# Patient Record
Sex: FEMALE | Race: ASIAN | URBAN - METROPOLITAN AREA
[De-identification: names, ages, dates, MRNs, and addresses within clinical notes are randomized per-mention and may not be internally consistent; named-entity substitution may affect disease eponyms.]

---

## 2017-10-01 ENCOUNTER — TRANSFERRED RECORDS (OUTPATIENT)
Dept: HEALTH INFORMATION MANAGEMENT | Facility: CLINIC | Age: 32
End: 2017-10-01

## 2017-10-01 LAB — PAP SMEAR - HIM PATIENT REPORTED: NEGATIVE

## 2017-11-10 LAB
HBV SURFACE AG SERPL QL IA: NORMAL
RUBELLA ANTIBODY IGG QUANTITATIVE: NORMAL IU/ML

## 2018-04-30 LAB — GROUP B STREP PCR: NORMAL

## 2018-05-30 ENCOUNTER — ANESTHESIA EVENT (OUTPATIENT)
Dept: OBGYN | Facility: CLINIC | Age: 33
End: 2018-05-30
Payer: COMMERCIAL

## 2018-05-30 ENCOUNTER — ANESTHESIA (OUTPATIENT)
Dept: OBGYN | Facility: CLINIC | Age: 33
End: 2018-05-30
Payer: COMMERCIAL

## 2018-05-30 ENCOUNTER — HOSPITAL ENCOUNTER (INPATIENT)
Facility: CLINIC | Age: 33
LOS: 2 days | Discharge: HOME | End: 2018-06-01
Attending: OBSTETRICS & GYNECOLOGY | Admitting: OBSTETRICS & GYNECOLOGY
Payer: COMMERCIAL

## 2018-05-30 LAB
ABO + RH BLD: NORMAL
ABO + RH BLD: NORMAL
SPECIMEN EXP DATE BLD: NORMAL
T PALLIDUM AB SER QL: NONREACTIVE

## 2018-05-30 PROCEDURE — 25000128 H RX IP 250 OP 636: Performed by: ANESTHESIOLOGY

## 2018-05-30 PROCEDURE — 25000128 H RX IP 250 OP 636: Performed by: PHYSICIAN ASSISTANT

## 2018-05-30 PROCEDURE — 72200001 ZZH LABOR CARE VAGINAL DELIVERY SINGLE

## 2018-05-30 PROCEDURE — 37000011 ZZH ANESTHESIA WARD SERVICE

## 2018-05-30 PROCEDURE — 00HU33Z INSERTION OF INFUSION DEVICE INTO SPINAL CANAL, PERCUTANEOUS APPROACH: ICD-10-PCS | Performed by: ANESTHESIOLOGY

## 2018-05-30 PROCEDURE — 86900 BLOOD TYPING SEROLOGIC ABO: CPT | Performed by: PHYSICIAN ASSISTANT

## 2018-05-30 PROCEDURE — 3E0R3BZ INTRODUCTION OF ANESTHETIC AGENT INTO SPINAL CANAL, PERCUTANEOUS APPROACH: ICD-10-PCS | Performed by: ANESTHESIOLOGY

## 2018-05-30 PROCEDURE — 36415 COLL VENOUS BLD VENIPUNCTURE: CPT | Performed by: PHYSICIAN ASSISTANT

## 2018-05-30 PROCEDURE — 25000132 ZZH RX MED GY IP 250 OP 250 PS 637: Performed by: OBSTETRICS & GYNECOLOGY

## 2018-05-30 PROCEDURE — 12000037 ZZH R&B POSTPARTUM INTERMEDIATE

## 2018-05-30 PROCEDURE — 86901 BLOOD TYPING SEROLOGIC RH(D): CPT | Performed by: PHYSICIAN ASSISTANT

## 2018-05-30 PROCEDURE — 86780 TREPONEMA PALLIDUM: CPT | Performed by: PHYSICIAN ASSISTANT

## 2018-05-30 PROCEDURE — 10907ZC DRAINAGE OF AMNIOTIC FLUID, THERAPEUTIC FROM PRODUCTS OF CONCEPTION, VIA NATURAL OR ARTIFICIAL OPENING: ICD-10-PCS | Performed by: OBSTETRICS & GYNECOLOGY

## 2018-05-30 PROCEDURE — 0HQ9XZZ REPAIR PERINEUM SKIN, EXTERNAL APPROACH: ICD-10-PCS | Performed by: OBSTETRICS & GYNECOLOGY

## 2018-05-30 PROCEDURE — 25000125 ZZHC RX 250: Performed by: PHYSICIAN ASSISTANT

## 2018-05-30 PROCEDURE — 25000125 ZZHC RX 250: Performed by: OBSTETRICS & GYNECOLOGY

## 2018-05-30 RX ORDER — OXYTOCIN/0.9 % SODIUM CHLORIDE 30/500 ML
100-340 PLASTIC BAG, INJECTION (ML) INTRAVENOUS CONTINUOUS PRN
Status: DISCONTINUED | OUTPATIENT
Start: 2018-05-30 | End: 2018-05-30

## 2018-05-30 RX ORDER — ROPIVACAINE HYDROCHLORIDE 2 MG/ML
10 INJECTION, SOLUTION EPIDURAL; INFILTRATION; PERINEURAL ONCE
Status: DISCONTINUED | OUTPATIENT
Start: 2018-05-30 | End: 2018-05-30

## 2018-05-30 RX ORDER — EPHEDRINE SULFATE 50 MG/ML
INJECTION, SOLUTION INTRAMUSCULAR; INTRAVENOUS; SUBCUTANEOUS
Status: DISCONTINUED
Start: 2018-05-30 | End: 2018-05-30 | Stop reason: WASHOUT

## 2018-05-30 RX ORDER — NALOXONE HYDROCHLORIDE 0.4 MG/ML
.1-.4 INJECTION, SOLUTION INTRAMUSCULAR; INTRAVENOUS; SUBCUTANEOUS
Status: DISCONTINUED | OUTPATIENT
Start: 2018-05-30 | End: 2018-05-30

## 2018-05-30 RX ORDER — NALBUPHINE HYDROCHLORIDE 10 MG/ML
2.5-5 INJECTION, SOLUTION INTRAMUSCULAR; INTRAVENOUS; SUBCUTANEOUS EVERY 6 HOURS PRN
Status: DISCONTINUED | OUTPATIENT
Start: 2018-05-30 | End: 2018-05-30

## 2018-05-30 RX ORDER — BISACODYL 10 MG
10 SUPPOSITORY, RECTAL RECTAL DAILY PRN
Status: DISCONTINUED | OUTPATIENT
Start: 2018-06-01 | End: 2018-06-01 | Stop reason: HOSPADM

## 2018-05-30 RX ORDER — PRENATAL VIT/IRON FUM/FOLIC AC 27MG-0.8MG
1 TABLET ORAL DAILY
Status: ON HOLD | COMMUNITY
End: 2018-05-30

## 2018-05-30 RX ORDER — ONDANSETRON 2 MG/ML
4 INJECTION INTRAMUSCULAR; INTRAVENOUS EVERY 6 HOURS PRN
Status: DISCONTINUED | OUTPATIENT
Start: 2018-05-30 | End: 2018-05-30

## 2018-05-30 RX ORDER — LANOLIN 100 %
OINTMENT (GRAM) TOPICAL
Status: DISCONTINUED | OUTPATIENT
Start: 2018-05-30 | End: 2018-06-01 | Stop reason: HOSPADM

## 2018-05-30 RX ORDER — CARBOPROST TROMETHAMINE 250 UG/ML
250 INJECTION, SOLUTION INTRAMUSCULAR
Status: DISCONTINUED | OUTPATIENT
Start: 2018-05-30 | End: 2018-05-30

## 2018-05-30 RX ORDER — ACETAMINOPHEN 325 MG/1
650 TABLET ORAL EVERY 4 HOURS PRN
Status: DISCONTINUED | OUTPATIENT
Start: 2018-05-30 | End: 2018-06-01 | Stop reason: HOSPADM

## 2018-05-30 RX ORDER — AMOXICILLIN 250 MG
2 CAPSULE ORAL 2 TIMES DAILY
Status: DISCONTINUED | OUTPATIENT
Start: 2018-05-30 | End: 2018-06-01 | Stop reason: HOSPADM

## 2018-05-30 RX ORDER — ROPIVACAINE HYDROCHLORIDE 2 MG/ML
INJECTION, SOLUTION EPIDURAL; INFILTRATION; PERINEURAL
Status: COMPLETED
Start: 2018-05-30 | End: 2018-05-30

## 2018-05-30 RX ORDER — LIDOCAINE HYDROCHLORIDE AND EPINEPHRINE 15; 5 MG/ML; UG/ML
3 INJECTION, SOLUTION EPIDURAL
Status: DISCONTINUED | OUTPATIENT
Start: 2018-05-30 | End: 2018-05-30

## 2018-05-30 RX ORDER — ROPIVACAINE HYDROCHLORIDE 2 MG/ML
INJECTION, SOLUTION EPIDURAL; INFILTRATION; PERINEURAL PRN
Status: DISCONTINUED | OUTPATIENT
Start: 2018-05-30 | End: 2018-05-31 | Stop reason: HOSPADM

## 2018-05-30 RX ORDER — OXYCODONE AND ACETAMINOPHEN 5; 325 MG/1; MG/1
1 TABLET ORAL
Status: DISCONTINUED | OUTPATIENT
Start: 2018-05-30 | End: 2018-05-30

## 2018-05-30 RX ORDER — PENICILLIN G POTASSIUM 5000000 [IU]/1
5 INJECTION, POWDER, FOR SOLUTION INTRAMUSCULAR; INTRAVENOUS ONCE
Status: COMPLETED | OUTPATIENT
Start: 2018-05-30 | End: 2018-05-30

## 2018-05-30 RX ORDER — SODIUM CHLORIDE, SODIUM LACTATE, POTASSIUM CHLORIDE, CALCIUM CHLORIDE 600; 310; 30; 20 MG/100ML; MG/100ML; MG/100ML; MG/100ML
INJECTION, SOLUTION INTRAVENOUS CONTINUOUS
Status: DISCONTINUED | OUTPATIENT
Start: 2018-05-30 | End: 2018-05-30

## 2018-05-30 RX ORDER — METHYLERGONOVINE MALEATE 0.2 MG/ML
200 INJECTION INTRAVENOUS
Status: DISCONTINUED | OUTPATIENT
Start: 2018-05-30 | End: 2018-05-30

## 2018-05-30 RX ORDER — ACETAMINOPHEN 325 MG/1
650 TABLET ORAL EVERY 4 HOURS PRN
Status: DISCONTINUED | OUTPATIENT
Start: 2018-05-30 | End: 2018-05-30

## 2018-05-30 RX ORDER — IBUPROFEN 400 MG/1
800 TABLET, FILM COATED ORAL EVERY 6 HOURS PRN
Status: DISCONTINUED | OUTPATIENT
Start: 2018-05-30 | End: 2018-06-01 | Stop reason: HOSPADM

## 2018-05-30 RX ORDER — MISOPROSTOL 200 UG/1
400 TABLET ORAL
Status: DISCONTINUED | OUTPATIENT
Start: 2018-05-30 | End: 2018-06-01 | Stop reason: HOSPADM

## 2018-05-30 RX ORDER — EPHEDRINE SULFATE 50 MG/ML
5 INJECTION, SOLUTION INTRAMUSCULAR; INTRAVENOUS; SUBCUTANEOUS
Status: DISCONTINUED | OUTPATIENT
Start: 2018-05-30 | End: 2018-05-30

## 2018-05-30 RX ORDER — OXYTOCIN/0.9 % SODIUM CHLORIDE 30/500 ML
1-24 PLASTIC BAG, INJECTION (ML) INTRAVENOUS CONTINUOUS
Status: DISCONTINUED | OUTPATIENT
Start: 2018-05-30 | End: 2018-05-30

## 2018-05-30 RX ORDER — ONDANSETRON 4 MG/1
4 TABLET, ORALLY DISINTEGRATING ORAL EVERY 6 HOURS PRN
Status: DISCONTINUED | OUTPATIENT
Start: 2018-05-30 | End: 2018-05-30

## 2018-05-30 RX ORDER — IBUPROFEN 400 MG/1
800 TABLET, FILM COATED ORAL
Status: DISCONTINUED | OUTPATIENT
Start: 2018-05-30 | End: 2018-05-30

## 2018-05-30 RX ORDER — OXYTOCIN/0.9 % SODIUM CHLORIDE 30/500 ML
340 PLASTIC BAG, INJECTION (ML) INTRAVENOUS CONTINUOUS PRN
Status: DISCONTINUED | OUTPATIENT
Start: 2018-05-30 | End: 2018-06-01 | Stop reason: HOSPADM

## 2018-05-30 RX ORDER — LIDOCAINE 40 MG/G
CREAM TOPICAL
Status: DISCONTINUED | OUTPATIENT
Start: 2018-05-30 | End: 2018-05-30

## 2018-05-30 RX ORDER — AMOXICILLIN 250 MG
1 CAPSULE ORAL 2 TIMES DAILY
Status: DISCONTINUED | OUTPATIENT
Start: 2018-05-30 | End: 2018-06-01 | Stop reason: HOSPADM

## 2018-05-30 RX ORDER — HYDROCORTISONE 2.5 %
CREAM (GRAM) TOPICAL 3 TIMES DAILY PRN
Status: DISCONTINUED | OUTPATIENT
Start: 2018-05-30 | End: 2018-06-01 | Stop reason: HOSPADM

## 2018-05-30 RX ORDER — OXYTOCIN 10 [USP'U]/ML
10 INJECTION, SOLUTION INTRAMUSCULAR; INTRAVENOUS
Status: DISCONTINUED | OUTPATIENT
Start: 2018-05-30 | End: 2018-06-01 | Stop reason: HOSPADM

## 2018-05-30 RX ORDER — OXYTOCIN/0.9 % SODIUM CHLORIDE 30/500 ML
100 PLASTIC BAG, INJECTION (ML) INTRAVENOUS CONTINUOUS
Status: DISCONTINUED | OUTPATIENT
Start: 2018-05-30 | End: 2018-06-01 | Stop reason: HOSPADM

## 2018-05-30 RX ORDER — NALOXONE HYDROCHLORIDE 0.4 MG/ML
.1-.4 INJECTION, SOLUTION INTRAMUSCULAR; INTRAVENOUS; SUBCUTANEOUS
Status: DISCONTINUED | OUTPATIENT
Start: 2018-05-30 | End: 2018-06-01 | Stop reason: HOSPADM

## 2018-05-30 RX ADMIN — ACETAMINOPHEN 650 MG: 325 TABLET, FILM COATED ORAL at 17:41

## 2018-05-30 RX ADMIN — PENICILLIN G POTASSIUM 5 MILLION UNITS: 5000000 POWDER, FOR SOLUTION INTRAMUSCULAR; INTRAPLEURAL; INTRATHECAL; INTRAVENOUS at 08:21

## 2018-05-30 RX ADMIN — Medication 12 ML/HR: at 11:04

## 2018-05-30 RX ADMIN — IBUPROFEN 800 MG: 400 TABLET ORAL at 17:41

## 2018-05-30 RX ADMIN — Medication 2.5 MILLION UNITS: at 12:46

## 2018-05-30 RX ADMIN — Medication 2 MILLI-UNITS/MIN: at 08:22

## 2018-05-30 RX ADMIN — Medication 340 ML/HR: at 14:21

## 2018-05-30 RX ADMIN — SODIUM CHLORIDE, POTASSIUM CHLORIDE, SODIUM LACTATE AND CALCIUM CHLORIDE: 600; 310; 30; 20 INJECTION, SOLUTION INTRAVENOUS at 08:21

## 2018-05-30 RX ADMIN — SENNOSIDES AND DOCUSATE SODIUM 1 TABLET: 8.6; 5 TABLET ORAL at 21:06

## 2018-05-30 RX ADMIN — ROPIVACAINE HYDROCHLORIDE 10 ML: 2 INJECTION, SOLUTION EPIDURAL; INFILTRATION at 10:47

## 2018-05-30 ASSESSMENT — ENCOUNTER SYMPTOMS
SEIZURES: 0
DYSRHYTHMIAS: 0

## 2018-05-30 NOTE — PLAN OF CARE
Data: Alexsandra Pickens transferred to 414 via wheelchair at 1810. Baby transferred via parent's arms.  Action: Receiving unit notified of transfer: Yes. Patient and family notified of room change. Report given to Selene MORROW at 1810. Belongings sent to receiving unit. Accompanied by Registered Nurse. Oriented patient to surroundings. Call light within reach. ID bands double-checked with receiving RN.  Response: Patient tolerated transfer and is stable.

## 2018-05-30 NOTE — IP AVS SNAPSHOT
[address]    [place] [address]    Phone:  [phone]                                       After Visit Summary   5/30/2018    Alexsandra Pickens    MRN: [id number]           After Visit Summary Signature Page     I have received my discharge instructions, and my questions have been answered. I have discussed any challenges I see with this plan with the nurse or doctor.    ..........................................................................................................................................  Patient/Patient Representative Signature      ..........................................................................................................................................  Patient Representative Print Name and Relationship to Patient    ..................................................               ................................................  Date                                            Time    ..........................................................................................................................................  Reviewed by Signature/Title    ...................................................              ..............................................  Date                                                            Time

## 2018-05-30 NOTE — IP AVS SNAPSHOT
MRN:[id number]                      After Visit Summary   5/30/2018    Alexsandra Pickens    MRN: [id number]           Thank you!     Thank you for choosing [facility] for your care. Our goal is always to provide you with excellent care. Hearing back from our patients is one way we can continue to improve our services. Please take a few minutes to complete the written survey that you may receive in the mail after you visit with us. Thank you!        Patient Information     Date Of Birth          1985        Designated Caregiver       Most Recent Value    Caregiver    Name of designated caregiver Issa Santiago    Phone number of caregiver [phone]      About your hospital stay     You were admitted on:  May 30, 2018 You last received care in the:  [address]    You were discharged on:  June 1, 2018       Who to Call     For medical emergencies, please call 911.  For non-urgent questions about your medical care, please call your primary care provider or clinic, None          Attending Provider     Provider Specialty    Jose R Kelsey MD OB/Gyn       Primary Care Provider Fax #    Physician No Ref-Primary [phone]      Further instructions from your care team       Postpartum Vaginal Delivery Instructions    Activity       Ask family and friends for help when you need it.    Do not place anything in your vagina for 6 weeks.    You are not restricted on other activities, but take it easy for a few weeks to allow your body to recover from delivery.  You are able to do any activities you feel up to that point.    No driving until you have stopped taking your pain medications (usually two weeks after delivery).     Call your health care provider if you have any of these symptoms:       Increased pain, swelling, redness, or fluid around your stiches from an episiotomy or perineal tear.    A fever above 100.4 F (38 C) with or without chills when  "placing a thermometer under your tongue.    You soak a sanitary pad with blood within 1 hour, or you see blood clots larger than a golf ball.    Bleeding that lasts more than 6 weeks.    Vaginal discharge that smells bad.    Severe pain, cramping or tenderness in your lower belly area.    A need to urinate more frequently (use the toilet more often), more urgently (use the toilet very quickly), or it burns when you urinate.    Nausea and vomiting.    Redness, swelling or pain around a vein in your leg.    Problems breastfeeding or a red or painful area on your breast.    Chest pain and cough or are gasping for air.    Problems coping with sadness, anxiety, or depression.  If you have any concerns about hurting yourself or the baby, call your provider immediately.     You have questions or concerns after you return home.     Keep your hands clean:  Always wash your hands before touching your perineal area and stitches.  This helps reduce your risk of infection.  If your hands aren't dirty, you may use an alcohol hand-rub to clean your hands. Keep your nails clean and short.        Pending Results     No orders found from 5/28/2018 to 5/31/2018.            Statement of Approval     Ordered          06/01/18 1432  I have reviewed and agree with all the recommendations and orders detailed in this document.  EFFECTIVE NOW     Approved and electronically signed by:  Jose R Kelsey MD             Admission Information     Date & Time Provider Department Dept. Phone    5/30/2018 Jose R Kelsey MD [address] [phone]      Your Vitals Were     Blood Pressure Pulse Temperature Respirations Height Weight    111/74 61 97.9  F (36.6  C) (Oral) 16 1.64 m (5' 4.57\") 75.8 kg (167 lb)    Pulse Oximetry BMI (Body Mass Index)                96% 28.16 kg/m2          MyChart Information     [employer] lets you send messages to your doctor, view your test results, renew your " "prescriptions, schedule appointments and more. To sign up, go to [email or web address] . Click on \"Log in\" on the left side of the screen, which will take you to the Welcome page. Then click on \"Sign up Now\" on the right side of the page.     You will be asked to enter the access code listed below, as well as some personal information. Please follow the directions to create your username and password.     Your access code is: [email or web address]  Expires: 2018  2:37 PM     Your access code will  in 90 days. If you need help or a new code, please call your [facility] clinic or [phone].        Care EveryWhere ID     This is your Care EveryWhere ID. This could be used by other organizations to access your [facility] medical records  [id number]        Equal Access to Services     [facility] : Kaia Nix, demetrice christine, ladi hardwick, kamran lerma . So [facility] [phone].    ATENCIÓN: Si habla español, tiene a mccabe disposición servicios gratuitos de asistencia lingüística. Llame al [phone].    We comply with applicable federal civil rights laws and [place] laws. We do not discriminate on the basis of race, color, national origin, age, disability, sex, sexual orientation, or gender identity.               Review of your medicines      CONTINUE these medicines which have NOT CHANGED        Dose / Directions    [facility] PRENATAL 28-0.8 MG Tabs        Refills:  0                Protect others around you: Learn how to safely use, store and throw away your medicines at www.disposemymeds.org.             Medication List: This is a list of all your medications and when to take them. Check marks below indicate your daily home schedule. Keep this list as a reference.      Medications           Morning Afternoon Evening Bedtime As Needed    [facility] PRENATAL 28-0.8 MG Tabs                                  "

## 2018-05-30 NOTE — PLAN OF CARE
Pt. admitted from L&D  via wheelchair and transferred to bed with standby assist. Pt. arrived with baby and was accompanied by  and arrived with personal belongings. Report was taken from Dianna in L&D. VSS. Fundus is firm and midline.  Vaginal bleeding is small rubra.  SL in left arm..  Pt. oriented to the room and call light system.

## 2018-05-30 NOTE — ANESTHESIA PREPROCEDURE EVALUATION
"Procedure: KAREN  Preop diagnosis: IUP/Labor     Allergies   Allergen Reactions     No Known Allergies      History reviewed. No pertinent past medical history.  History reviewed. No pertinent surgical history.  Social History   Substance Use Topics     Smoking status: Never Smoker     Smokeless tobacco: Never Used     Alcohol use No     Prior to Admission medications    Medication Sig Start Date End Date Taking? Authorizing Provider   Prenatal Vit-Fe Fumarate-FA ([facility] PR[facility]ATAL) 28-0.8 MG TABS  9/20/17  Yes Reported, Patient     Current Facility-Administered Medications Ordered in Epic   Medication Dose Route Frequency Last Rate Last Dose     acetaminophen (TYLENOL) tablet 650 mg  650 mg Oral Q4H PRN         carboprost (HEMABATE) injection 250 mcg  250 mcg Intramuscular Once PRN         ePHEDrine injection 5 mg  5 mg Intravenous Q3 Min PRN         fentaNYL (SUBLIMAZE) 2 mcg/mL, ropivacaine (NAROPIN) 0.2% in NaCl 0.9% EPIDURAL infusion   EPIDURAL Continuous 12 mL/hr at 05/30/18 1104 12 mL/hr at 05/30/18 1104     ibuprofen (ADVIL/MOTRIN) tablet 800 mg  800 mg Oral Once PRN         lactated ringers BOLUS 1,000 mL  1,000 mL Intravenous Once PRN        Or     lactated ringers BOLUS 500 mL  500 mL Intravenous Once PRN         lactated ringers BOLUS 250 mL  250 mL Intravenous Once PRN         lactated ringers BOLUS 500 mL  500 mL Intravenous Once PRN         lactated ringers infusion   Intravenous Continuous 125 mL/hr at 05/30/18 0821       lidocaine (LMX4) cream   Topical Q1H PRN         lidocaine 1 % 0.1-20 mL  0.1-20 mL Subcutaneous Once PRN         lidocaine 1 % 1 mL  1 mL Other Q1H PRN         medication instruction   Does not apply Continuous PRN         medication instruction   Does not apply Continuous PRN         Medication Instructions: misoprostol (CYTOTEC)- Nurse to discuss ordering with provider, if needed. Ordered via \"OB misoprostol (CYTOTEC) Postpartum Hemorrhage PANEL\"   Does not apply Continuous PRN         " methylergonovine (METHERGINE) injection 200 mcg  200 mcg Intramuscular Once PRN         nalbuphine (NUBAIN) injection 2.5-5 mg  2.5-5 mg Intravenous Q6H PRN         naloxone (NARCAN) injection 0.1-0.4 mg  0.1-0.4 mg Intravenous Q2 Min PRN         naloxone (NARCAN) injection 0.1-0.4 mg  0.1-0.4 mg Intravenous Q2 Min PRN         ondansetron (ZOFRAN) injection 4 mg  4 mg Intravenous Q6H PRN         Opioid plan postpartum - medication instruction   Does not apply Continuous PRN         oxyCODONE-acetaminophen (PERCOCET) 5-325 MG per tablet 1 tablet  1 tablet Oral Once PRN         oxytocin (PITOCIN) 30 units in 500 mL 0.9% NaCl infusion  1-24 marc-units/min Intravenous Continuous 10 mL/hr at 05/30/18 1100 10 marc-units/min at 05/30/18 1100     oxytocin (PITOCIN) 30 units in 500 mL 0.9% NaCl infusion  100-340 mL/hr Intravenous Continuous PRN         oxytocin (PITOCIN) injection 10 Units  10 Units Intramuscular Once PRN         penicillin G potassium injection 2.5 Million Units  2.5 Million Units Intravenous Q4H         ropivacaine (NAROPIN) 2 MG/ML injection             ropivacaine (NAROPIN) injection 10 mL  10 mL EPIDURAL Once         sodium chloride (PF) 0.9% PF flush 3 mL  3 mL Intracatheter Q1H PRN         sodium chloride (PF) 0.9% PF flush 3 mL  3 mL Intracatheter Q8H         No current [facility]-ordered outpatient prescriptions on file.       fentaNYL-ropivacaine 12 mL/hr (05/30/18 1104)     lactated ringers 125 mL/hr at 05/30/18 0821     - MEDICATION INSTRUCTIONS -       - MEDICATION INSTRUCTIONS -       - MEDICATION INSTRUCTIONS -       - MEDICATION INSTRUCTIONS -       oxytocin in 0.9% NaCl 10 marc-units/min (05/30/18 1100)     oxytocin in 0.9% NaCl       Wt Readings from Last 1 Encounters:   05/30/18 75.8 kg (167 lb)     Temp Readings from Last 1 Encounters:   05/30/18 36.4  C (97.6  F) (Temporal)     BP Readings from Last 6 Encounters:   05/30/18 102/69     Pulse Readings from Last 4 Encounters:   No data found  for Pulse     Resp Readings from Last 1 Encounters:   05/30/18 18     SpO2 Readings from Last 1 Encounters:   05/30/18 100%     Recent Labs   Lab Test  05/30/18   0755   ABO  B   RH  Pos       Anesthesia Evaluation     .             ROS/MED HX    ENT/Pulmonary:      (-) asthma and sleep apnea   Neurologic:     (+)migraines,    (-) seizures and CVA   Cardiovascular:        (-) hypertension, PIH, syncope, arrhythmias, irregular heartbeat/palpitations and valvular problems/murmurs   METS/Exercise Tolerance:     Hematologic:         Musculoskeletal:         GI/Hepatic:     (+) GERD      (-) liver disease   Renal/Genitourinary:      (-) renal disease   Endo:      (-) Type II DM and thyroid disease   Psychiatric:         Infectious Disease:         Malignancy:         Other:                            (-) no pre-eclampsia and gestational diabetes               Anesthesia Plan      History & Physical Review      ASA Status:  2 .        Plan for Epidural          Postoperative Care      Consents  Anesthetic plan, risks, benefits and alternatives discussed with:  Patient and Spouse..

## 2018-05-30 NOTE — L&D DELIVERY NOTE
Delivery Date:  2018      BRIEF HISTORY:  The patient is a 32-year-old  2, para 1-0-0-1, EDC of 2018 at 40 and 3/7 weeks was brought to Labor and Delivery for induction.  Prenatal course had been without complications.      HOSPITAL COURSE:  On admission, she was afebrile with stable vital signs.  Abdomen was gravid, estimated fetal weight approximately 7 pounds, noted to be in the vertex presentation.  Cervix was 2 to 3, 1, -2.  Contractions were minimal.  Fetal heart tones reactive.  She had artificial rupture of membranes, had antibiotic started for a positive group B strep status, as well as Pitocin augmentation.  During the course of her labor, she received an epidural, reached complete dilatation and effacement and was encouraged to push.  During the course of pushing, she brought the head down towards  position.  The head was delivered.  A nuchal cord x 2 was reduced.  Shoulders and torso soon followed, and infant handed off to nursery team in attendance with findings of a viable female infant whose weight is currently pending and Apgars are also unavailable.      She then went on to deliver an intact placenta with a 3-vessel cord.  Perineum was inspected and had a small first-degree laceration which was repaired in routine fashion using a 3-0 Vicryl.  Estimated blood loss about 350 mL.  There were no complications.         BRYAN SONG MD             D: 2018   T: 2018   MT: JU      Name:     RADU DOSHI   MRN:      [id number]        Account:        [id number]   :      1985        Delivery Date:  2018               Document: [id number]

## 2018-05-30 NOTE — ANESTHESIA PROCEDURE NOTES
Peripheral nerve/Neuraxial procedure note : epidural catheter  Pre-Procedure  Performed by POLO AARON  Location: OB      Pre-Anesthestic Checklist: patient identified, IV checked, risks and benefits discussed, informed consent, monitors and equipment checked, pre-op evaluation and at physician/surgeon's request    Timeout  Correct Patient: Yes   Correct Procedure: Yes   Correct Site: Yes   Correct Laterality: N/A   Correct Position: Yes   Site Marked: N/A   .   Procedure Documentation    .    Procedure:    Epidural catheter.  Insertion Site:L3-4  (midline approach) Injection technique: LORT saline   Local skin infiltrated with 3 mL of 1% lidocaine.  JACKELINE at 5 cm     Patient Prep;mask, sterile gloves, povidone-iodine 7.5% surgical scrub, patient draped.  .  Needle: [employer] needle Needle Gauge: 17.    Needle Length (Inches) 3.5  # of attempts: 1 and # of redirects: : 0. .   Catheter: 19 G . .  Catheter threaded easily  3 cm epidural space.  8 cm at skin.   .    Assessment/Narrative  Paresthesias: No.  .  .  Aspiration negative for heme or CSF  . Test dose of 3 mL lidocaine 1.5% w/ 1:200,000 epinephrine at. Test dose negative for signs of intravascular, subdural or intrathecal injection. Comments:  Pt tolerated well.   Taped sterile and secure.   FHTs stable post-procedure.   No complications.

## 2018-05-30 NOTE — PLAN OF CARE
"0730:  40w3d arrived to unit with  for \"induction of labor.\" Ambulated independently to room with belongings. Questions about process answered.   0747: Boulevard and EFM monitored explained and applied with consent. Dr. Kelsey at bedside for SVE and AROM. Discussed plan of care with pt.   1000: Pt reporting feeling uncomfortable, up on birthing ball   1015: Pt requesting epidural  1035: Dr Hannah, JACK at bedside for epidural  1115: Pt reporting that ctx on left side still uncomfortable and strong, MDA called for rebolus   1124: Dr Hannah at bedside for rebolus of epidural   1145: Pt reporting that ctx are feeling better  1230: Chavez placed, SVE done, pt reporting that she is not feeling ctx  1245: Dr. Kelsey at bedside for status update    "

## 2018-05-30 NOTE — H&P
No significant change in general health status based on examination of the patient, review of Nursing Admission Database and prenatal record.

## 2018-05-31 PROCEDURE — 12000037 ZZH R&B POSTPARTUM INTERMEDIATE

## 2018-05-31 PROCEDURE — 25000132 ZZH RX MED GY IP 250 OP 250 PS 637: Performed by: OBSTETRICS & GYNECOLOGY

## 2018-05-31 RX ADMIN — SENNOSIDES AND DOCUSATE SODIUM 1 TABLET: 8.6; 5 TABLET ORAL at 11:39

## 2018-05-31 RX ADMIN — ACETAMINOPHEN 650 MG: 325 TABLET, FILM COATED ORAL at 11:39

## 2018-05-31 RX ADMIN — ACETAMINOPHEN 650 MG: 325 TABLET, FILM COATED ORAL at 05:37

## 2018-05-31 RX ADMIN — IBUPROFEN 800 MG: 400 TABLET ORAL at 11:39

## 2018-05-31 RX ADMIN — ACETAMINOPHEN 650 MG: 325 TABLET, FILM COATED ORAL at 00:20

## 2018-05-31 RX ADMIN — IBUPROFEN 800 MG: 400 TABLET ORAL at 00:20

## 2018-05-31 RX ADMIN — IBUPROFEN 800 MG: 400 TABLET ORAL at 05:37

## 2018-05-31 RX ADMIN — IBUPROFEN 800 MG: 400 TABLET ORAL at 17:33

## 2018-05-31 RX ADMIN — SENNOSIDES AND DOCUSATE SODIUM 1 TABLET: 8.6; 5 TABLET ORAL at 21:17

## 2018-05-31 RX ADMIN — ACETAMINOPHEN 650 MG: 325 TABLET, FILM COATED ORAL at 17:33

## 2018-05-31 NOTE — PROGRESS NOTES
[facility]       DAILY NOTE - POSTPARTUM DAY 1     SUBJECTIVE:     Pain controlled? Yes  Tolerating a regular diet? YES  Ambulating? YES  Voiding without difficulty? Yes    OBJECTIVE:  Vitals:    18 1505 18 1820 18 2045 18 0015   BP: 101/58 102/60 114/68 115/75   Pulse:  68 62 53   Resp:  16 16 16   Temp:  98  F (36.7  C) 98.2  F (36.8  C) 97.8  F (36.6  C)   TempSrc:  Oral Oral Oral   SpO2: 96%      Weight:       Height:           Constitutional: healthy, alert and no distress    Abdomen:  Uterine fundus is firm, non-tender and at the level of the umbilicus     Incision:       LABS:  No results found for: HGB    ASSESSMENT:  Post-partum day #1 s/p   Pregnancy complicated by NO COMPLICATIONS    Doing well.       PLAN:   Continue routine postpartum cares  Anticipate discharge tomorrow    Jose R Kelsey MD

## 2018-05-31 NOTE — PLAN OF CARE
Problem: Patient Care Overview  Goal: Plan of Care/Patient Progress Review  Outcome: Improving  VSS. Bleeding, fundus WDL. Pain well controlled with ibuprofen, tylenol, ice. Voiding and ambulating independently, PIV removed. Breastfeeding well, reports pain on right side only. Deep latch observed on both sides. No signs of infection observed on right breast, encouraged to continue to rotate sides and attempt to relatch for pain. Spouse present and supportive. Will continue to monitor.

## 2018-05-31 NOTE — PLAN OF CARE
Problem: Patient Care Overview  Goal: Plan of Care/Patient Progress Review  Outcome: Improving  Patient is up independently, voiding without difficulty, pain well controlled with medications given as prescribed. She is having more cramping with breastfeeding but ibuprofen and tylenol are helping. Having some nipple tenderness with feeding, latch adjusted and lanolin given. Encouraged to continue to ambulate as able and void frequently.

## 2018-06-01 VITALS
TEMPERATURE: 97.9 F | RESPIRATION RATE: 16 BRPM | OXYGEN SATURATION: 96 % | WEIGHT: 167 LBS | HEART RATE: 61 BPM | DIASTOLIC BLOOD PRESSURE: 74 MMHG | HEIGHT: 65 IN | BODY MASS INDEX: 27.82 KG/M2 | SYSTOLIC BLOOD PRESSURE: 111 MMHG

## 2018-06-01 PROCEDURE — 25000132 ZZH RX MED GY IP 250 OP 250 PS 637: Performed by: OBSTETRICS & GYNECOLOGY

## 2018-06-01 RX ADMIN — ACETAMINOPHEN 650 MG: 325 TABLET, FILM COATED ORAL at 08:03

## 2018-06-01 RX ADMIN — SENNOSIDES AND DOCUSATE SODIUM 1 TABLET: 8.6; 5 TABLET ORAL at 08:04

## 2018-06-01 RX ADMIN — IBUPROFEN 800 MG: 400 TABLET ORAL at 01:39

## 2018-06-01 RX ADMIN — ACETAMINOPHEN 650 MG: 325 TABLET, FILM COATED ORAL at 01:39

## 2018-06-01 RX ADMIN — IBUPROFEN 800 MG: 400 TABLET ORAL at 08:03

## 2018-06-01 NOTE — PLAN OF CARE
Problem: Patient Care Overview  Goal: Plan of Care/Patient Progress Review  Outcome: Improving  VSS. Assessment WDL. Breastfeeding cluster feeding overnight. Patient reports that she believes she had lower supply of breast milk after first child. Supplemented with formula over night once for inconsolable infant.

## 2018-06-01 NOTE — ANESTHESIA POSTPROCEDURE EVALUATION
Patient: Alexsandra Pickens    * No procedures listed *    Diagnosis:* No pre-op diagnosis entered *  Diagnosis Additional Information: No value filed.    Anesthesia Type:  No value filed.    Note:  Anesthesia Post Evaluation    Patient location during evaluation: Bedside (Epidural placement site on the back is clean. No complications / side effects from the labor epidural)  Patient participation: Able to fully participate in evaluation  Level of consciousness: awake and alert  Pain management: adequate  Airway patency: patent  Cardiovascular status: acceptable  Respiratory status: acceptable and unassisted  Hydration status: acceptable  PONV: none             Last vitals:  Vitals:    05/31/18 0015 05/31/18 0805 05/31/18 1659   BP: 115/75 105/65 108/62   Pulse: 53 80 64   Resp: 16 16 16   Temp: 36.6  C (97.8  F) 36.7  C (98  F) 36.4  C (97.5  F)   SpO2:            Electronically Signed By: Kylie Elder MD  May 31, 2018  7:02 PM

## 2018-06-01 NOTE — PLAN OF CARE
Problem: Patient Care Overview  Goal: Plan of Care/Patient Progress Review  Outcome: Adequate for Discharge Date Met: 06/01/18  Vital signs stable. Patient voiding without difficulty. Able to ambulate in room free of dizziness. Taking tylenol/ibuprofen for pain management. Working on breastfeeding infant every 2-3 hours. Planning on discharging home today. Discharge instructions/follow up discussed. Questions/concerns addressed.

## 2018-06-01 NOTE — PROGRESS NOTES
[facility]       DAILY NOTE - POSTPARTUM DAY 2     SUBJECTIVE:     Pain controlled? Yes  Tolerating a regular diet? YES  Ambulating? YES  Voiding without difficulty? Yes    OBJECTIVE:  Vitals:    18 1659 18 2310 18 0803 18 0900   BP: 108/62 107/73 111/74    BP Location:       Pulse: 64 61     Resp: 16 16 16 16   Temp: 97.5  F (36.4  C) 98  F (36.7  C) 97.9  F (36.6  C)    TempSrc: Oral Oral Oral    SpO2:       Weight:       Height:           Constitutional: healthy, alert and no distress    Abdomen:  Uterine fundus is firm, non-tender and at the level of the umbilicus     Incision:       LABS:  No results found for: HGB    ASSESSMENT:  Post-partum day #2 s/p   Pregnancy complicated by NO COMPLICATIONS    Doing well.       PLAN:   Discharge today.  Return to clinic in 6 weeks.   Continue routine postpartum cares    Jose R Kelsey MD

## 2018-06-01 NOTE — LACTATION NOTE
Follow up visit.  Infant feeding at time of visit.  Latched well.  Alexsandra reports her nipples are tender but intact.  Using lanolin.  Shells for sore nipples given with instruction on use.  Answered questions about signs infant is getting enough.  Reviewed feeding log.  Alexsandra did supplement once and was concerned that after baby was not latching well.  Discussed indications for supplementation and potential difficulties with feedings.  Baby did start latching better again.  Encouraged her to exclusively breast feed and not supplement unless there is medical indication.  Reassured her that at this time, baby is feeding well and getting exactly what she needs. Reviewed indications for supplementation.  Alexsandra felt better after conversation.   She has a Spectra pump for home use.  Reviewed when to start pumping to store milk and normal process of milk coming in along with outpatient lactation resources if needed upon discharge.    Hien Cox  RN, IBCLC

## 2018-11-12 ENCOUNTER — OFFICE VISIT (OUTPATIENT)
Dept: FAMILY MEDICINE | Facility: CLINIC | Age: 33
End: 2018-11-12
Payer: COMMERCIAL

## 2018-11-12 VITALS
WEIGHT: 159 LBS | SYSTOLIC BLOOD PRESSURE: 113 MMHG | OXYGEN SATURATION: 97 % | BODY MASS INDEX: 26.82 KG/M2 | TEMPERATURE: 100.3 F | HEART RATE: 106 BPM | DIASTOLIC BLOOD PRESSURE: 82 MMHG

## 2018-11-12 DIAGNOSIS — J20.9 ACUTE BRONCHITIS, UNSPECIFIED ORGANISM: Primary | ICD-10-CM

## 2018-11-12 LAB
DEPRECATED S PYO AG THROAT QL EIA: NORMAL
SPECIMEN SOURCE: NORMAL

## 2018-11-12 PROCEDURE — 87081 CULTURE SCREEN ONLY: CPT | Performed by: INTERNAL MEDICINE

## 2018-11-12 PROCEDURE — 87880 STREP A ASSAY W/OPTIC: CPT | Performed by: INTERNAL MEDICINE

## 2018-11-12 PROCEDURE — 99213 OFFICE O/P EST LOW 20 MIN: CPT | Performed by: INTERNAL MEDICINE

## 2018-11-12 RX ORDER — ALBUTEROL SULFATE 90 UG/1
2 AEROSOL, METERED RESPIRATORY (INHALATION) EVERY 6 HOURS PRN
Qty: 1 INHALER | Refills: 1 | Status: SHIPPED | OUTPATIENT
Start: 2018-11-12 | End: 2019-02-12

## 2018-11-12 NOTE — PROGRESS NOTES
SUBJECTIVE:   Alexsandra Pickens is a 33 year old female who presents to clinic today for the following health issues:      Acute Illness   Acute illness concerns: sore throat   Onset: yesterday     Fever: YES    Chills/Sweats: YES    Headache (location?): YES    Sinus Pressure:YES    Conjunctivitis:  no    Ear Pain: YES: right    Rhinorrhea: YES    Congestion: no     Sore Throat: YES     Cough: no    Wheeze: no    Decreased Appetite: YES    Nausea: no     Vomiting: no     Diarrhea:  no     Dysuria/Freq.: no     Fatigue/Achiness: YES    Sick/Strep Exposure: no      Therapies Tried and outcome: tylenol     Sore throat that started yesterday. She has a 5 month old daughter who had similar symptoms and was diagnosed with an ear infection (this is her 2nd ear infection in the past 3 weeks).         Problem list and histories reviewed & adjusted, as indicated.  Additional history: as documented    Patient Active Problem List   Diagnosis     Indication for care in labor or delivery     No past surgical history on file.    Social History   Substance Use Topics     Smoking status: Never Smoker     Smokeless tobacco: Never Used     Alcohol use No     No family history on file.        Reviewed and updated as needed this visit by clinical staff       Reviewed and updated as needed this visit by Provider         ROS:  Constitutional, HEENT, cardiovascular, pulmonary, gi and gu systems are negative, except as otherwise noted.    OBJECTIVE:     /82  Pulse 106  Temp 100.3  F (37.9  C) (Tympanic)  Wt 159 lb (72.1 kg)  SpO2 97%  BMI 26.82 kg/m2  Body mass index is 26.82 kg/(m^2).  GENERAL: healthy, alert and no distress  EYES: Eyes grossly normal to inspection, PERRL and conjunctivae and sclerae normal  HENT: ear canals and TM's normal, nose and mouth without ulcers or lesions  NECK: no adenopathy, no asymmetry, masses, or scars and thyroid normal to palpation  RESP: Occasional wheezing noted  CV: regular rate and rhythm,  normal S1 S2, no S3 or S4, no murmur, click or rub, no peripheral edema and peripheral pulses strong    Diagnostic Test Results:  Results for orders placed or performed in visit on 11/12/18 (from the past 24 hour(s))   Strep, Rapid Screen   Result Value Ref Range    Specimen Description Throat     Rapid Strep A Screen       NEGATIVE: No Group A streptococcal antigen detected by immunoassay, await culture report.       ASSESSMENT/PLAN:     1. Acute bronchitis, unspecified organism  See patient instructions. Recommending albuterol for wheezing, a humidifier, cough drops, and fluids.   - Strep, Rapid Screen  - Beta strep group A culture  - albuterol (PROAIR HFA/PROVENTIL HFA/VENTOLIN HFA) 108 (90 Base) MCG/ACT inhaler; Inhale 2 puffs into the lungs every 6 hours as needed for shortness of breath / dyspnea or wheezing  Dispense: 1 Inhaler; Refill: 1      Yolanda Molina MD  [facility] [place]

## 2018-11-12 NOTE — MR AVS SNAPSHOT
After Visit Summary   11/12/2018    Alexsandra Pickens    MRN: [id number]           Patient Information     Date Of Birth          1985        Visit Information        Provider Department      11/12/2018 5:40 PM Yolanda Molina MD [facility] [place]        Today's Diagnoses     Acute bronchitis, unspecified organism    -  1      Care Instructions      Viral or Bacterial Bronchitis with Wheezing (Adult)    Bronchitis is an infection of the air passages. It often occurs during a cold and is usually caused by a virus. Symptoms include cough with mucus (phlegm) and low-grade fever. This illness is contagious during the first few days and is spread through the air by coughing and sneezing, or by direct contact (touching the sick person and then touching your own eyes, nose, or mouth).  If there is a lot of inflammation, air flow is restricted. The air passages may also go into spasm, especially if you have asthma. This causes wheezing and difficulty breathing even in people who do not have asthma.  Bronchitis usually lasts 7 to 14 days. The wheezing should improve with treatment during the first week. An inhaler is often prescribed to relax the air passages and stop wheezing. Antibiotics will be prescribed if your doctor thinks there is also a secondary bacterial infection.  Home care    If symptoms are severe, rest at home for the first 2 to 3 days. When you go back to your usual activities, don't let yourself get too tired.    Dont s'moke. Also avoid being exposed to secondhand smoke.    You may use over-the-counter medicine to control fever or pain, unless another medicine was prescribed. Note: If you have chronic liver or kidney disease or have ever had a stomach ulcer or gastrointestinal bleeding, talk with your healthcare provider before using these medicines. Also talk to your provider if you are taking medicine to prevent blood clots.) Aspirin should never be given to anyone younger  than 18 years of age who is ill with a viral infection or fever. It may cause severe liver or brain damage.    Your appetite may be poor, so a light diet is fine. Stay well hydrated by drinking 6 to 8 glasses of fluids per day (such as water, soft drinks, sports drinks, juices, tea, or soup). Extra fluids will help loosen secretions in the nose and lungs.    Over-the-counter cough, cold, and sore-throat medicines will not shorten the length of the illness, but they may be helpful to reduce symptoms. (Note: Don't use decongestants if you have high blood pressure.)    If you were given an inhaler, use it exactly as directed. If you need to use it more often than prescribed, your condition may be worsening. If this happens, contact your healthcare provider.    If prescribed, finish all antibiotic medicine, even if you are feeling better after only a few days.  Follow-up care  Follow up with your healthcare provider, or as advised. If you had an X-ray or ECG (electrocardiogram), a specialist will review it. You will be notified of any new findings that may affect your care.  If you are age 65 or older, or if you have a chronic lung disease or condition that affects your immune system, or you smoke, ask your healthcare provider about getting a pneumococcal vaccine and a yearly flu shot (influenza vaccine).  When to seek medical advice  Call your healthcare provider right away if any of these occur:    Fever of 100.4 F (38 C) or higher, or as directed by your healthcare provider    Coughing up increasing amounts of colored sputum    Weakness, drowsiness, headache, facial pain, ear pain, or a stiff neck  Call 911  Call 911 if any of these occur.    Coughing up blood    Worsening weakness, drowsiness, headache, or stiff neck    Increased wheezing not helped with medication, shortness of breath, or pain with breathing   Date Last Reviewed: 6/1/2018 2000-2018 [employer]. [address] [facility], [place], [place]  "[address]. All rights reserved. This information is not intended as a substitute for professional medical care. Always follow your healthcare professional's instructions.                Follow-ups after your visit        Follow-up notes from your care team     Return in about 1 week (around 2018), or if symptoms worsen or fail to improve.      Who to contact     If you have questions or need follow up information about today's clinic visit or your schedule please contact [facility] [place] directly at [phone].  Normal or non-critical lab and imaging results will be communicated to you by Recordanthart, letter or phone within 4 business days after the clinic has received the results. If you do not hear from us within 7 days, please contact the clinic through Recordanthart or phone. If you have a critical or abnormal lab result, we will notify you by phone as soon as possible.  Submit refill requests through Anafore or call your pharmacy and they will forward the refill request to us. Please allow 3 business days for your refill to be completed.          Additional Information About Your Visit        MyCharAltobridge Information     Anafore lets you send messages to your doctor, view your test results, renew your prescriptions, schedule appointments and more. To sign up, go to [email or web address] . Click on \"Log in\" on the left side of the screen, which will take you to the Welcome page. Then click on \"Sign up Now\" on the right side of the page.     You will be asked to enter the access code listed below, as well as some personal information. Please follow the directions to create your username and password.     Your access code is: [email or web address]  Expires: 2/10/2019  6:21 PM     Your access code will  in 90 days. If you need help or a new code, please call your [facility] clinic or [phone].        Care EveryWhere ID     This is your Care EveryWhere ID. This could be used by other organizations to access your " [facility] medical records  [id number]        Your Vitals Were     Pulse Temperature Pulse Oximetry BMI (Body Mass Index)          106 100.3  F (37.9  C) (Tympanic) 97% 26.82 kg/m2         Blood Pressure from Last 3 Encounters:   11/12/18 113/82   06/01/18 111/74    Weight from Last 3 Encounters:   11/12/18 159 lb (72.1 kg)   05/30/18 167 lb (75.8 kg)              We Performed the Following     Beta strep group A culture     Strep, Rapid Screen          Today's Medication Changes          These changes are accurate as of 11/12/18  6:21 PM.  If you have any questions, ask your nurse or doctor.               Start taking these medicines.        Dose/Directions    albuterol 108 (90 Base) MCG/ACT inhaler   Commonly known as:  PROAIR HFA/PROVENTIL HFA/VENTOLIN HFA   Used for:  Acute bronchitis, unspecified organism   Started by:  Yolanda Molina MD        Dose:  2 puff   Inhale 2 puffs into the lungs every 6 hours as needed for shortness of breath / dyspnea or wheezing   Quantity:  1 Inhaler   Refills:  1            Where to get your medicines      These medications were sent to [facility]/pharmacy #[id number] [place] [id number] [address]     Phone:  [phone]     albuterol 108 (90 Base) MCG/ACT inhaler                Primary Care Provider Fax #    Physician No Ref-Primary [phone]       No address on file        Equal Access to Services     ARMANDO BAINS AH: Hadii lewis Nix, waaxda luqadaha, qaybta kaalmada adedamianyada, kamran reza. So [facility] [phone].    ATENCIÓN: Si habla español, tiene a mccabe disposición servicios gratuitos de asistencia lingüística. Llame al [phone].    We comply with applicable federal civil rights laws and [place] laws. We do not discriminate on the basis of race, color, national origin, age, disability, sex, sexual orientation, or gender identity.            Thank you!     Thank you for choosing  [facility] [place]  for your care. Our goal is always to provide you with excellent care. Hearing back from our patients is one way we can continue to improve our services. Please take a few minutes to complete the written survey that you may receive in the mail after your visit with us. Thank you!             Your Updated Medication List - Protect others around you: Learn how to safely use, store and throw away your medicines at www.disposemymeds.org.          This list is accurate as of 11/12/18  6:21 PM.  Always use your most recent med list.                   Brand Name Dispense Instructions for use Diagnosis    albuterol 108 (90 Base) MCG/ACT inhaler    PROAIR HFA/PROVENTIL HFA/VENTOLIN HFA    1 Inhaler    Inhale 2 puffs into the lungs every 6 hours as needed for shortness of breath / dyspnea or wheezing    Acute bronchitis, unspecified organism       [employer] PRENATAL 28-0.8 MG Tabs

## 2018-11-12 NOTE — Clinical Note
Please abstract the following data from this visit with this patient into the appropriate field in Epic:  Pap smear done on this date: 10/2017 (approximately), by this group: [facility] , results were normal

## 2018-11-13 LAB
BACTERIA SPEC CULT: NORMAL
SPECIMEN SOURCE: NORMAL

## 2018-11-13 NOTE — PATIENT INSTRUCTIONS
Viral or Bacterial Bronchitis with Wheezing (Adult)    Bronchitis is an infection of the air passages. It often occurs during a cold and is usually caused by a virus. Symptoms include cough with mucus (phlegm) and low-grade fever. This illness is contagious during the first few days and is spread through the air by coughing and sneezing, or by direct contact (touching the sick person and then touching your own eyes, nose, or mouth).  If there is a lot of inflammation, air flow is restricted. The air passages may also go into spasm, especially if you have asthma. This causes wheezing and difficulty breathing even in people who do not have asthma.  Bronchitis usually lasts 7 to 14 days. The wheezing should improve with treatment during the first week. An inhaler is often prescribed to relax the air passages and stop wheezing. Antibiotics will be prescribed if your doctor thinks there is also a secondary bacterial infection.  Home care    If symptoms are severe, rest at home for the first 2 to 3 days. When you go back to your usual activities, don't let yourself get too tired.    Dont s'moke. Also avoid being exposed to secondhand smoke.    You may use over-the-counter medicine to control fever or pain, unless another medicine was prescribed. Note: If you have chronic liver or kidney disease or have ever had a stomach ulcer or gastrointestinal bleeding, talk with your healthcare provider before using these medicines. Also talk to your provider if you are taking medicine to prevent blood clots.) Aspirin should never be given to anyone younger than 18 years of age who is ill with a viral infection or fever. It may cause severe liver or brain damage.    Your appetite may be poor, so a light diet is fine. Stay well hydrated by drinking 6 to 8 glasses of fluids per day (such as water, soft drinks, sports drinks, juices, tea, or soup). Extra fluids will help loosen secretions in the nose and lungs.    Over-the-counter  cough, cold, and sore-throat medicines will not shorten the length of the illness, but they may be helpful to reduce symptoms. (Note: Don't use decongestants if you have high blood pressure.)    If you were given an inhaler, use it exactly as directed. If you need to use it more often than prescribed, your condition may be worsening. If this happens, contact your healthcare provider.    If prescribed, finish all antibiotic medicine, even if you are feeling better after only a few days.  Follow-up care  Follow up with your healthcare provider, or as advised. If you had an X-ray or ECG (electrocardiogram), a specialist will review it. You will be notified of any new findings that may affect your care.  If you are age 65 or older, or if you have a chronic lung disease or condition that affects your immune system, or you smoke, ask your healthcare provider about getting a pneumococcal vaccine and a yearly flu shot (influenza vaccine).  When to seek medical advice  Call your healthcare provider right away if any of these occur:    Fever of 100.4 F (38 C) or higher, or as directed by your healthcare provider    Coughing up increasing amounts of colored sputum    Weakness, drowsiness, headache, facial pain, ear pain, or a stiff neck  Call 911  Call 911 if any of these occur.    Coughing up blood    Worsening weakness, drowsiness, headache, or stiff neck    Increased wheezing not helped with medication, shortness of breath, or pain with breathing   Date Last Reviewed: 6/1/2018 2000-2018 [employer]. [address] [place] [address], [place] [address]. All rights reserved. This information is not intended as a substitute for professional medical care. Always follow your healthcare professional's instructions.

## 2018-11-20 ENCOUNTER — NURSE TRIAGE (OUTPATIENT)
Dept: NURSING | Facility: CLINIC | Age: 33
End: 2018-11-20

## 2018-11-20 ENCOUNTER — OFFICE VISIT (OUTPATIENT)
Dept: FAMILY MEDICINE | Facility: CLINIC | Age: 33
End: 2018-11-20
Payer: COMMERCIAL

## 2018-11-20 VITALS
OXYGEN SATURATION: 98 % | HEART RATE: 98 BPM | SYSTOLIC BLOOD PRESSURE: 128 MMHG | BODY MASS INDEX: 26.98 KG/M2 | DIASTOLIC BLOOD PRESSURE: 90 MMHG | WEIGHT: 160 LBS | TEMPERATURE: 98.9 F

## 2018-11-20 DIAGNOSIS — J01.90 ACUTE SINUSITIS WITH SYMPTOMS > 10 DAYS: Primary | ICD-10-CM

## 2018-11-20 PROCEDURE — 99213 OFFICE O/P EST LOW 20 MIN: CPT | Performed by: INTERNAL MEDICINE

## 2018-11-20 RX ORDER — FLUTICASONE PROPIONATE 50 MCG
1-2 SPRAY, SUSPENSION (ML) NASAL DAILY
Qty: 1 BOTTLE | Refills: 11 | Status: SHIPPED | OUTPATIENT
Start: 2018-11-20 | End: 2019-02-12

## 2018-11-20 NOTE — MR AVS SNAPSHOT
After Visit Summary   11/20/2018    Alexsandra Pickens    MRN: [id number]           Patient Information     Date Of Birth          1985        Visit Information        Provider Department      11/20/2018 9:00 AM Yolanda Molina MD [facility] [place] [facility]        Today's Diagnoses     Acute sinusitis with symptoms > 10 days    -  1      Care Instructions      Sinusitis (Antibiotic Treatment)    The sinuses are air-filled spaces within the bones of the face. They connect to the inside of the nose. Sinusitis is an inflammation of the tissue that lines the sinuses. Sinusitis can occur during a cold. It can also happen due to allergies to pollens and other particles in the air. Sinusitis can cause symptoms of sinus congestion and a feeling of fullness. A sinus infection causes fever, headache, and facial pain. There is often green or yellow fluid draining from the nose or into the back of the throat (post-nasal drip). You have been given antibiotics to treat this condition.  Home care    Take the full course of antibiotics as instructed. Do not stop taking them, even when you feel better.    Drink plenty of water, hot tea, and other liquids. This may help thin nasal mucus. It also may help your sinuses drain fluids.    Heat may help soothe painful areas of your face. Use a towel soaked in hot water. Or,  the shower and direct the warm spray onto your face. Using a vaporizer along with a menthol rub at night may also help soothe symptoms.     An expectorant with guaifenesin may help thin nasal mucus and help your sinuses drain fluids.    You can use an over-the-counter decongestant, unless a similar medicine was prescribed to you. Nasal sprays work the fastest. Use one that contains phenylephrine or oxymetazoline. First blow your nose gently. Then use the spray. Do not use these medicines more often than directed on the label. If you do, your symptoms may get worse. You may also take pills  that contain pseudoephedrine. Don t use products that combine multiple medicines. This is because side effects may be increased. Read labels. You can also ask the pharmacist for help. (People with high blood pressure should not use decongestants. They can raise blood pressure.)    Over-the-counter antihistamines may help if allergies contributed to your sinusitis.      Do not use nasal rinses or irrigation during an acute sinus infection, unless your healthcare provider tells you to. Rinsing may spread the infection to other areas in your sinuses.    Use acetaminophen or ibuprofen to control pain, unless another pain medicine was prescribed to you. If you have chronic liver or kidney disease or ever had a stomach ulcer, talk with your healthcare provider before using these medicines. (Aspirin should never be taken by anyone under age 18 who is ill with a fever. It may cause severe liver damage.)    Don't smoke. This can make symptoms worse.  Follow-up care  Follow up with your healthcare provider or our staff if you are better in 1 week.  When to seek medical advice  Call your healthcare provider if any of these occur:    Facial pain or headache that gets worse    Stiff neck    Unusual drowsiness or confusion    Swelling of your forehead or eyelids    Vision problems, such as blurred or double vision    Fever of 100.4 F (38 C) or higher, or as directed by your healthcare provider    Seizure    Breathing problems    Symptoms don't go away in 10 days  Prevention  Here are steps you can take to help prevent an infection:    Keep good hand washing habits.    Don t have close contact with people who have sore throats, colds, or other upper respiratory infections.    Don t smoke, and stay away from secondhand smoke.    Stay up to date with of your vaccines.  Date Last Reviewed: 11/1/2017 2000-2018 [employer]. [address] [place] [address], [place] [address]. All rights reserved. This information is not intended  "as a substitute for professional medical care. Always follow your healthcare professional's instructions.                Follow-ups after your visit        Follow-up notes from your care team     Return in about 3 days (around 2018), or if symptoms worsen or fail to improve.      Who to contact     If you have questions or need follow up information about today's clinic visit or your schedule please contact [facility] [place] directly at [phone].  Normal or non-critical lab and imaging results will be communicated to you by [employer], letter or phone within 4 business days after the clinic has received the results. If you do not hear from us within 7 days, please contact the clinic through [employer] or phone. If you have a critical or abnormal lab result, we will notify you by phone as soon as possible.  Submit refill requests through [employer] or call your pharmacy and they will forward the refill request to us. Please allow 3 business days for your refill to be completed.          Additional Information About Your Visit        [employer] Information     Audacious lets you send messages to your doctor, view your test results, renew your prescriptions, schedule appointments and more. To sign up, go to [email or web address] . Click on \"Log in\" on the left side of the screen, which will take you to the Welcome page. Then click on \"Sign up Now\" on the right side of the page.     You will be asked to enter the access code listed below, as well as some personal information. Please follow the directions to create your username and password.     Your access code is: [email or web address]  Expires: 2/10/2019  6:21 PM     Your access code will  in 90 days. If you need help or a new code, please call your [facility] clinic or [phone].        Care EveryWhere ID     This is your Care EveryWhere ID. This could be used by other organizations to access your [facility] medical records  [id number]        Your Vitals " Were     Pulse Temperature Pulse Oximetry BMI (Body Mass Index)          98 98.9  F (37.2  C) (Oral) 98% 26.98 kg/m2         Blood Pressure from Last 3 Encounters:   11/20/18 128/90   11/12/18 113/82   06/01/18 111/74    Weight from Last 3 Encounters:   11/20/18 160 lb (72.6 kg)   11/12/18 159 lb (72.1 kg)   05/30/18 167 lb (75.8 kg)              Today, you had the following     No orders found for display         Today's Medication Changes          These changes are accurate as of 11/20/18  9:26 AM.  If you have any questions, ask your nurse or doctor.               Start taking these medicines.        Dose/Directions    amoxicillin-clavulanate 875-125 MG per tablet   Commonly known as:  AUGMENTIN   Used for:  Acute sinusitis with symptoms > 10 days   Started by:  Yolanda Molina MD        Dose:  1 tablet   Take 1 tablet by mouth 2 times daily   Quantity:  14 tablet   Refills:  0       fluticasone 50 MCG/ACT spray   Commonly known as:  FLONASE   Used for:  Acute sinusitis with symptoms > 10 days   Started by:  Yolanda Molina MD        Dose:  1-2 spray   Spray 1-2 sprays into both nostrils daily   Quantity:  1 Bottle   Refills:  11            Where to get your medicines      These medications were sent to [facility] [place]  [place] [address] [address]     Phone:  [phone]     amoxicillin-clavulanate 875-125 MG per tablet    fluticasone 50 MCG/ACT spray                Primary Care Provider Fax #    Physician No Ref-Primary [phone]       No address on file        Equal Access to Services     [facility]: Hadkrishna Nix, wachelsey lugustavo, qaybta kaalmamarjorie hardwick, kamran reza. So [facility] [phone].    ATENCIÓN: Si habla español, tiene a mccabe disposición servicios gratuitos de asistencia lingüística. Llame al [phone].    We comply with applicable federal civil rights laws and  [place] laws. We do not discriminate on the basis of race, color, national origin, age, disability, sex, sexual orientation, or gender identity.            Thank you!     Thank you for choosing [facility] [place]  for your care. Our goal is always to provide you with excellent care. Hearing back from our patients is one way we can continue to improve our services. Please take a few minutes to complete the written survey that you may receive in the mail after your visit with us. Thank you!             Your Updated Medication List - Protect others around you: Learn how to safely use, store and throw away your medicines at www.disposemymeds.org.          This list is accurate as of 11/20/18  9:26 AM.  Always use your most recent med list.                   Brand Name Dispense Instructions for use Diagnosis    albuterol 108 (90 Base) MCG/ACT inhaler    PROAIR HFA/PROVENTIL HFA/VENTOLIN HFA    1 Inhaler    Inhale 2 puffs into the lungs every 6 hours as needed for shortness of breath / dyspnea or wheezing    Acute bronchitis, unspecified organism       amoxicillin-clavulanate 875-125 MG per tablet    AUGMENTIN    14 tablet    Take 1 tablet by mouth 2 times daily    Acute sinusitis with symptoms > 10 days       [facility] PRENATAL 28-0.8 MG Tabs           fluticasone 50 MCG/ACT spray    FLONASE    1 Bottle    Spray 1-2 sprays into both nostrils daily    Acute sinusitis with symptoms > 10 days

## 2018-11-20 NOTE — PATIENT INSTRUCTIONS

## 2018-11-20 NOTE — TELEPHONE ENCOUNTER
"Patient states she was seen by Provider today for sinusitis.  States she picked up her antibiotic at the Pharmacy and thought she had been prescribed \"Augmentin because I am breastfeeding.\"   States the prescription bottle is labeled \"amoxicillin-clavulanate\".     This RN reviewed with caller per [facility] chart review of Medication prescribed today:    Date/Time Action Taken User Additional Information   11/20/18 0952 Ranjana Molina, Yolanda Irwin MD    Medication Detail      Disp Refills Start End CRISS   amoxicillin-clavulanate (AUGMENTIN) 875-125 MG per tablet 14 tablet 0 11/20/2018  --   Sig - Route: Take 1 tablet by mouth 2 times daily - Oral     Protocol-- Medication Question-Adult Caller and care advice reviewed with caller.  Caller states understanding of the recommended disposition- this RN reviewed Medication noted above with caller.  Advised to call back if further questions or concerns.     Latasha Callahan RN  [facility]            Additional Information    Caller has medication question, adult has minor symptoms, caller declines triage, and triager answers question    Protocols used: MEDICATION QUESTION CALL-ADULT-      "

## 2018-11-20 NOTE — PROGRESS NOTES
SUBJECTIVE:   Alexsandra Pickens is a 33 year old female who presents to clinic today for the following health issues:      Acute Illness   Acute illness concerns: sinus problem   Onset: last week     Fever: no     Chills/Sweats: YES    Headache (location?): YES    Sinus Pressure:YES    Conjunctivitis:  no    Ear Pain: YES- feels full     Rhinorrhea: YES    Congestion: YES    Sore Throat: no      Cough: YES    Wheeze: no     Decreased Appetite: YES    Nausea: no     Vomiting: no     Diarrhea:  no     Dysuria/Freq.: no     Fatigue/Achiness: YES    Sick/Strep Exposure: no      Therapies Tried and outcome: tylenol, Advil     I saw Alexsandra a week ago for bronchitis. This has improved but now she is getting pain in her face and pressure. She has had one really bad sinus infection and actually was crying from the pain in her maxillary sinuses. She is afraid that this will turn into that. She has two young children at home and is breastfeeding right now.      Problem list and histories reviewed & adjusted, as indicated.  Additional history: as documented    Patient Active Problem List   Diagnosis     Indication for care in labor or delivery     No past surgical history on file.    Social History   Substance Use Topics     Smoking status: Never Smoker     Smokeless tobacco: Never Used     Alcohol use No     No family history on file.        Reviewed and updated as needed this visit by clinical staff  Allergies       Reviewed and updated as needed this visit by Provider         ROS:  Constitutional, HEENT, cardiovascular, pulmonary, gi and gu systems are negative, except as otherwise noted.    OBJECTIVE:     /90  Pulse 98  Temp 98.9  F (37.2  C) (Oral)  Wt 160 lb (72.6 kg)  SpO2 98%  BMI 26.98 kg/m2  Body mass index is 26.98 kg/(m^2).  GENERAL: healthy, alert and no distress  EYES: Eyes grossly normal to inspection, PERRL and conjunctivae and sclerae normal  HENT: normal cephalic/atraumatic, ear canals and TM's  normal, rhinorrhea yellow and purulent, oropharynx clear, oral mucous membranes moist and sinuses: maxillary tenderness on both sides  NECK: no adenopathy, no asymmetry, masses, or scars and thyroid normal to palpation  RESP: lungs clear to auscultation - no rales, rhonchi or wheezes  CV: regular rate and rhythm, normal S1 S2, no S3 or S4, no murmur, click or rub, no peripheral edema and peripheral pulses strong    Diagnostic Test Results:  none     ASSESSMENT/PLAN:       1. Acute sinusitis with symptoms > 10 days  - amoxicillin-clavulanate (AUGMENTIN) 875-125 MG per tablet; Take 1 tablet by mouth 2 times daily  Dispense: 14 tablet; Refill: 0  - fluticasone (FLONASE) 50 MCG/ACT spray; Spray 1-2 sprays into both nostrils daily  Dispense: 1 Bottle; Refill: 11    See Patient Instructions    Yolanda Molina MD  [facility]

## 2018-11-28 ENCOUNTER — OFFICE VISIT (OUTPATIENT)
Dept: FAMILY MEDICINE | Facility: CLINIC | Age: 33
End: 2018-11-28
Payer: COMMERCIAL

## 2018-11-28 VITALS
HEART RATE: 72 BPM | DIASTOLIC BLOOD PRESSURE: 70 MMHG | BODY MASS INDEX: 26.82 KG/M2 | TEMPERATURE: 97.9 F | WEIGHT: 159 LBS | SYSTOLIC BLOOD PRESSURE: 110 MMHG

## 2018-11-28 DIAGNOSIS — J01.90 ACUTE SINUSITIS WITH SYMPTOMS > 10 DAYS: Primary | ICD-10-CM

## 2018-11-28 PROCEDURE — 99213 OFFICE O/P EST LOW 20 MIN: CPT | Performed by: NURSE PRACTITIONER

## 2018-11-28 NOTE — PROGRESS NOTES
SUBJECTIVE:                                                      Alexsandra Pickens is a 33 year old female who presents to clinic today for the following health issues:    Concern - Pt is here to f/u for her previous OV. Overall feeling better.  Finished her abx's yesterday. Still experiencing some coughing with yellow phlegm and slight facial pain. Would like to know if she is over the sinusitis     HPI: Recently treated for sinusitis with Augmentin. Symptoms have improved, but she is still left with left-sided frontal and maxillary sinus pain/pressure and thick, yellow sputum draining from left naris. No fever. Just finished 7 day course of Augmentin yesterday. Denies wheeze, but endorses mild cough.       Problem list and histories reviewed & adjusted, as indicated.  Additional history: as documented    Reviewed and updated as needed this visit by clinical staff  Tobacco  Allergies  Meds  Problems       Reviewed and updated as needed this visit by Provider  Allergies  Meds  Problems         ROS:  Constitutional, HEENT, pulmonary systems are negative, except as otherwise noted.    OBJECTIVE:                                                      /70 (BP Location: Left arm, Patient Position: Chair, Cuff Size: Adult Regular)  Pulse 72  Temp 97.9  F (36.6  C) (Tympanic)  Wt 159 lb (72.1 kg)  BMI 26.82 kg/m2 Body mass index is 26.82 kg/(m^2).   GENERAL: healthy, alert, well nourished, well hydrated, no distress  HENT: ear canals- normal; TMs- normal; Nose- normal; Mouth- mildly erythematous posterior oropharynx, but without edema. Yellow drainage on posterior wall. Tenderness to palpation over left cheek.  NECK: no tenderness, no adenopathy, no asymmetry, no masses, no stiffness; thyroid- normal to palpation  RESP: lungs clear to auscultation - no rales, no rhonchi, no wheezes  CV: regular rates and rhythm, normal S1 S2, no S3 or S4 and no murmur, no click or rub -    Diagnostic test results:  none      ASSESSMENT/PLAN:                                                      Alexsandra was seen today for recheck. Improving clinical picture, but resolution not yet realized. Discussed extending duration of Augmentin course versus switching agent. She states that she was much more ill at onset, so she is confident that Augmentin was working. Knowing this, we decided to add four more days to her Augmentin course. Agrees with this strategy. She agrees to return after 5 days if her symptoms persist (or sooner if they worsen). Also discussed benefits of nasal irrigation and intranasal fluticasone use for prevention of future episodes.  Discussed risks, benefits, alternatives, and potential side effects of new medication / treatment. Agrees with plan of care. All questions answered.     Diagnoses and all orders for this visit:    Acute sinusitis with symptoms > 10 days  -     amoxicillin-clavulanate (AUGMENTIN) 875-125 MG tablet; Take 1 tablet by mouth 2 times daily for 4 days      Risks, benefits and alternatives of treatments discussed. Plan agreed upon and all questions answered.      Follow-Up: Return in about 5 days (around 12/3/2018), or if symptoms worsen or fail to improve.    See Patient Instructions      Sudeep Cisneros, APRN, CNP

## 2018-11-28 NOTE — MR AVS SNAPSHOT
"              After Visit Summary   11/28/2018    Alexsandra Pickens    MRN: [id number]           Patient Information     Date Of Birth          1985        Visit Information        Provider Department      11/28/2018 7:40 AM S[facility]ep [facility] [place]        Today's Diagnoses     Acute sinusitis with symptoms > 10 days    -  1      Care Instructions    UPPER RESPIRATORY INFECTION SUPPORTIVE CARES:  Stay hydrated (even if you're not thirsty)  Warm compresses over sinuses, or allow warm shower water to run down face  Neti-Pot sinus / nasal rinse  Flonase or other intranasal steroid MAY help  Humidifier at night  Tylenol or ibuprofen for fever and aches & pains                Follow-ups after your visit        Follow-up notes from your care team     Return in about 5 days (around 12/3/2018), or if symptoms worsen or fail to improve.      Who to contact     If you have questions or need follow up information about today's clinic visit or your schedule please contact [facility] directly at [phone].  Normal or non-critical lab and imaging results will be communicated to you by [employer], letter or phone within 4 business days after the clinic has received the results. If you do not hear from us within 7 days, please contact the clinic through [employer] or phone. If you have a critical or abnormal lab result, we will notify you by phone as soon as possible.  Submit refill requests through "[employer] or call your pharmacy and they will forward the refill request to us. Please allow 3 business days for your refill to be completed.          Additional Information About Your Visit        MyChart Information     "[employer] lets you send messages to your doctor, view your test results, renew your prescriptions, schedule appointments and more. To sign up, go to [email or web address] . Click on \"Log in\" on the left side of the screen, which will take you to the Welcome page. Then click " "on \"Sign up Now\" on the right side of the page.     You will be asked to enter the access code listed below, as well as some personal information. Please follow the directions to create your username and password.     Your access code is: [email or web address]  Expires: 2/10/2019  6:21 PM     Your access code will  in 90 days. If you need help or a new code, please call your [place] clinic or [phone].        Care EveryWhere ID     This is your Care EveryWhere ID. This could be used by other organizations to access your [place] medical records  [id number]        Your Vitals Were     Pulse Temperature BMI (Body Mass Index)             72 97.9  F (36.6  C) (Tympanic) 26.82 kg/m2          Blood Pressure from Last 3 Encounters:   18 110/70   18 128/90   18 113/82    Weight from Last 3 Encounters:   18 159 lb (72.1 kg)   18 160 lb (72.6 kg)   18 159 lb (72.1 kg)              Today, you had the following     No orders found for display         Where to get your medicines      These medications were sent to [facility]/pharmacy #[id number] - [address] [address]     Phone:  [phone]     amoxicillin-clavulanate 875-125 MG tablet          Primary Care Provider Office Phone # Fax #    Yolanda Molina [phone]       [address] [facility] [address]        Equal Access to Services     [facility] VistaINA : Hadii lewis cyr Sovamsi, waaxda luqadaha, qaybta kaalmakamran murguia. So [facility] [phone].    ATENCIÓN: Si habla español, tiene a mccabe disposición servicios gratuitos de asistencia lingüística. Llame al [phone].    We comply with applicable federal civil rights laws and [place] laws. We do not discriminate on the basis of race, color, national origin, age, disability, sex, sexual orientation, or gender identity.            Thank you!     Thank you for " choosing [facility] [place]  for your care. Our goal is always to provide you with excellent care. Hearing back from our patients is one way we can continue to improve our services. Please take a few minutes to complete the written survey that you may receive in the mail after your visit with us. Thank you!             Your Updated Medication List - Protect others around you: Learn how to safely use, store and throw away your medicines at www.disposemymeds.org.          This list is accurate as of 11/28/18  8:12 AM.  Always use your most recent med list.                   Brand Name Dispense Instructions for use Diagnosis    albuterol 108 (90 Base) MCG/ACT inhaler    PROAIR HFA/PROVENTIL HFA/VENTOLIN HFA    1 Inhaler    Inhale 2 puffs into the lungs every 6 hours as needed for shortness of breath / dyspnea or wheezing    Acute bronchitis, unspecified organism       amoxicillin-clavulanate 875-125 MG tablet    AUGMENTIN    8 tablet    Take 1 tablet by mouth 2 times daily for 4 days    Acute sinusitis with symptoms > 10 days       CVS PRENATAL 28-0.8 MG Tabs           fluticasone 50 MCG/ACT nasal spray    FLONASE    1 Bottle    Spray 1-2 sprays into both nostrils daily    Acute sinusitis with symptoms > 10 days

## 2018-11-28 NOTE — PATIENT INSTRUCTIONS
UPPER RESPIRATORY INFECTION SUPPORTIVE CARES:  Stay hydrated (even if you're not thirsty)  Warm compresses over sinuses, or allow warm shower water to run down face  Neti-Pot sinus / nasal rinse  Flonase or other intranasal steroid MAY help  Humidifier at night  Tylenol or ibuprofen for fever and aches & pains

## 2019-02-12 ENCOUNTER — OFFICE VISIT (OUTPATIENT)
Dept: FAMILY MEDICINE | Facility: CLINIC | Age: 34
End: 2019-02-12
Payer: COMMERCIAL

## 2019-02-12 VITALS
SYSTOLIC BLOOD PRESSURE: 100 MMHG | TEMPERATURE: 98 F | HEIGHT: 65 IN | DIASTOLIC BLOOD PRESSURE: 64 MMHG | WEIGHT: 150 LBS | OXYGEN SATURATION: 97 % | HEART RATE: 77 BPM | BODY MASS INDEX: 24.99 KG/M2

## 2019-02-12 DIAGNOSIS — M25.562 LEFT KNEE PAIN, UNSPECIFIED CHRONICITY: Primary | ICD-10-CM

## 2019-02-12 PROCEDURE — 99213 OFFICE O/P EST LOW 20 MIN: CPT | Performed by: FAMILY MEDICINE

## 2019-02-12 RX ORDER — IBUPROFEN 200 MG
600 TABLET ORAL EVERY 8 HOURS PRN
Qty: 30 TABLET | COMMUNITY
Start: 2019-02-12

## 2019-02-12 ASSESSMENT — MIFFLIN-ST. JEOR: SCORE: 1379.45

## 2019-02-12 NOTE — PROGRESS NOTES
"  SUBJECTIVE:   Alexsandra Pickens is a 33 year old female who presents to clinic today for the following health issues:      Musculoskeletal problem/Knee pain      Duration: x 3 weeks on and off but becoming more frequent     .initially her rt ankle and knee both were sore,  but ankle is better now but has ongoing mild pain with rt knee , so wanted to get checked     Description  Location: right knee.     Intensity:  Mild - Moderate, dull ache, pressure like pain medial side of rt knee , no redness or swelling , hurts after she  stand for a while , and feel more pressure  . It is not constant and feel worse  end of the day     Accompanying signs and symptoms: breastfeeding     History  Previous similar problem: no . No recall of any injury or strain   Previous evaluation:  none    Precipitating or alleviating factors:  Trauma or overuse: no   Aggravating factors include: standing long time  and climbing stairs    Therapies tried and outcome: nothing          Problem list and histories reviewed & adjusted, as indicated.  Additional history: as documented    Patient Active Problem List   Diagnosis     Indication for care in labor or delivery     No past surgical history on file.    Social History     Tobacco Use     Smoking status: Never Smoker     Smokeless tobacco: Never Used   Substance Use Topics     Alcohol use: No     No family history on file.        Reviewed and updated as needed this visit by clinical staff       Reviewed and updated as needed this visit by Provider         ROS:  Constitutional, HEENT, cardiovascular, pulmonary, GI, , musculoskeletal, neuro, skin, endocrine and psych systems are negative, except as otherwise noted.    OBJECTIVE:     /64   Pulse 77   Temp 98  F (36.7  C) (Tympanic)   Ht 1.64 m (5' 4.57\")   Wt 68 kg (150 lb)   SpO2 97%   BMI 25.29 kg/m    Body mass index is 25.29 kg/m .  GENERAL: healthy, alert and no distress  RESP: lungs clear to auscultation - no rales, rhonchi or " wheezes  CV: regular rate and rhythm, normal S1 S2,  MS: rt ankle and knee with normal range of motion,  no edema , no swelling or redness. She points more to medial side of her knee where she had felt pain although she has no palpable pain at this time and has  no tenderness to palpation on knee joint or joint line or patella. ROM of ankle and knee dose not aggravate any pain   NEURO: Normal strength and tone, normal        ASSESSMENT/PLAN:         (M25.562) Left knee pain, unspecified chronicity  (primary encounter diagnosis)  Comment: likely mild medial strain   Plan: ibuprofen (ADVIL/MOTRIN) 200 MG tablet               Discussed  cares and symptomatic treatment including  adequate pain control,  stretches etc.  She is comfortable taking OTC pain med's as needed.  she will do follow up if no improvement or problem. Consider further evaluation and  physical therapy if needed.       Patient expressed understanding and agreement with treatment plan. All patient's questions were answered, will let me know if has more later.  Medications: Rx's: Reviewed the potential side effects/complications of medications prescribed.     Yeni Skelton MD  [facility] [place]

## 2019-07-17 ENCOUNTER — OFFICE VISIT (OUTPATIENT)
Dept: FAMILY MEDICINE | Facility: CLINIC | Age: 34
End: 2019-07-17
Payer: COMMERCIAL

## 2019-07-17 VITALS
TEMPERATURE: 98.4 F | HEART RATE: 79 BPM | SYSTOLIC BLOOD PRESSURE: 100 MMHG | OXYGEN SATURATION: 98 % | BODY MASS INDEX: 24.32 KG/M2 | WEIGHT: 146 LBS | DIASTOLIC BLOOD PRESSURE: 70 MMHG | HEIGHT: 65 IN

## 2019-07-17 DIAGNOSIS — Z13.220 SCREENING FOR HYPERLIPIDEMIA: ICD-10-CM

## 2019-07-17 DIAGNOSIS — Z00.00 ROUTINE GENERAL MEDICAL EXAMINATION AT A HEALTH CARE FACILITY: Primary | ICD-10-CM

## 2019-07-17 PROCEDURE — 99395 PREV VISIT EST AGE 18-39: CPT | Performed by: INTERNAL MEDICINE

## 2019-07-17 ASSESSMENT — MIFFLIN-ST. JEOR: SCORE: 1355.19

## 2019-07-17 NOTE — PROGRESS NOTES
SUBJECTIVE:   CC: Alexsandra Pickens is an 34 year old woman who presents for preventive health visit.     Lives with  and two kids, age 6 and 1 year old.  Works as a .     Healthy Habits:    Do you get at least three servings of calcium containing foods daily (dairy, green leafy vegetables, etc.)? yes    Amount of exercise or daily activities, outside of work: 0 day(s) per week, hard to find time     Problems taking medications regularly not applicable    Medication side effects: No    Have you had an eye exam in the past two years? no    Do you see a dentist twice per year? no    Do you have sleep apnea, excessive snoring or daytime drowsiness?no          Today's PHQ-2 Score:   PHQ-2 ( 1999 [employer]) 7/17/2019 11/12/2018   Q1: Little interest or pleasure in doing things 0 0   Q2: Feeling down, depressed or hopeless 0 0   PHQ-2 Score 0 0       Abuse: Current or Past(Physical, Sexual or Emotional)- NO  Do you feel safe in your environment? YES    Social History     Tobacco Use     Smoking status: Never Smoker     Smokeless tobacco: Never Used   Substance Use Topics     Alcohol use: No     If you drink alcohol do you typically have >3 drinks per day or >7 drinks per week? No                     Reviewed orders with patient.  Reviewed health maintenance and updated orders accordingly - Yes  Patient Active Problem List   Diagnosis   (none) - all problems resolved or deleted     History reviewed. No pertinent surgical history.    Social History     Tobacco Use     Smoking status: Never Smoker     Smokeless tobacco: Never Used   Substance Use Topics     Alcohol use: No     Family History   Problem Relation Age of Onset     Diabetes Father      Hypertension Father      Cerebrovascular Disease Father      Diabetes Maternal Aunt      Diabetes Paternal Aunt          Current Outpatient Medications   Medication Sig Dispense Refill     Prenatal Vit-Fe Fumarate-FA ([facility] PRENATAL) 28-0.8 MG TABS        UNABLE  "TO FIND MEDICATION NAME: Kadi       ibuprofen (ADVIL/MOTRIN) 200 MG tablet Take 3 tablets (600 mg) by mouth every 8 hours as needed for mild pain (Patient not taking: Reported on 7/17/2019) 30 tablet        Mammogram not appropriate for this patient based on age.    Pertinent mammograms are reviewed under the imaging tab.  History of abnormal Pap smear: NO - age 30- 65 PAP every 3 years recommended     Reviewed and updated as needed this visit by clinical staff  Tobacco  Allergies  Meds  Problems  Med Hx  Surg Hx  Fam Hx  Soc Hx          Reviewed and updated as needed this visit by Provider  Tobacco  Meds  Problems  Med Hx  Surg Hx  Fam Hx  Soc Hx           ROS:  CONSTITUTIONAL: NEGATIVE for fever, chills, change in weight  INTEGUMENTARU/SKIN: + bump in pubic area that comes and goes, can be painful and drain. NEGATIVE for worrisome rashes, moles or lesions  EYES: NEGATIVE for vision changes or irritation  ENT: NEGATIVE for ear, mouth and throat problems  RESP: NEGATIVE for significant cough or SOB  BREAST: NEGATIVE for masses, tenderness or discharge  CV: NEGATIVE for chest pain, palpitations or peripheral edema  GI: NEGATIVE for nausea, abdominal pain, heartburn, or change in bowel habits  : NEGATIVE for unusual urinary or vaginal symptoms. Periods have not returned yet.  MUSCULOSKELETAL: NEGATIVE for significant arthralgias or myalgia  NEURO: NEGATIVE for weakness, dizziness or paresthesias  PSYCHIATRIC: NEGATIVE for changes in mood or affect    OBJECTIVE:   /70 (BP Location: Left arm, Patient Position: Chair, Cuff Size: Adult Regular)   Pulse 79   Temp 98.4  F (36.9  C) (Tympanic)   Ht 1.638 m (5' 4.5\")   Wt 66.2 kg (146 lb)   SpO2 98%   Breastfeeding? Yes   BMI 24.67 kg/m    EXAM:  GENERAL: healthy, alert and no distress  EYES: Eyes grossly normal to inspection, PERRL and conjunctivae and sclerae normal  HENT: ear canals and TM's normal, mouth without ulcers or lesions  NECK: no " "adenopathy, no asymmetry, masses, or scars and thyroid normal to palpation  RESP: lungs clear to auscultation - no rales, rhonchi or wheezes  CV: regular rate and rhythm, normal S1 S2, no S3 or S4, no murmur, click or rub, no peripheral edema and peripheral pulses strong  ABDOMEN: soft, nontender, no hepatosplenomegaly, no masses and bowel sounds normal  MS: no gross musculoskeletal defects noted, no edema  SKIN: no suspicious lesions or rashes.   NEURO: Normal strength and tone, mentation intact and speech normal  PSYCH: mentation appears normal, affect normal/bright        ASSESSMENT/PLAN:   1. Routine general medical examination at a health care facility  Pap due next year  imms UTD  Condoms for contraception, declines need for hormonal method     2. Screening for hyperlipidemia  She would like to check her triglycerides, so will need to return for fasting labs   - Lipid panel reflex to direct LDL Fasting; Future    COUNSELING:   Reviewed preventive health counseling, as reflected in patient instructions  Special attention given to:        Regular exercise       Healthy diet/nutrition       Contraception    Estimated body mass index is 24.67 kg/m  as calculated from the following:    Height as of this encounter: 1.638 m (5' 4.5\").    Weight as of this encounter: 66.2 kg (146 lb).         reports that she has never smoked. She has never used smokeless tobacco.      Counseling Resources:  ATP IV Guidelines  Pooled Cohorts Equation Calculator  Breast Cancer Risk Calculator  FRAX Risk Assessment  ICSI Preventive Guidelines  Dietary Guidelines for Americans, 2010  USDA's MyPlate  ASA Prophylaxis  Lung CA Screening    Katlyn Caldera MD  [facility] [place], [place]  "

## 2019-09-26 ENCOUNTER — OFFICE VISIT (OUTPATIENT)
Dept: FAMILY MEDICINE | Facility: CLINIC | Age: 34
End: 2019-09-26
Payer: COMMERCIAL

## 2019-09-26 VITALS
TEMPERATURE: 99.8 F | WEIGHT: 146.6 LBS | HEIGHT: 65 IN | OXYGEN SATURATION: 100 % | HEART RATE: 105 BPM | DIASTOLIC BLOOD PRESSURE: 70 MMHG | SYSTOLIC BLOOD PRESSURE: 100 MMHG | BODY MASS INDEX: 24.43 KG/M2

## 2019-09-26 DIAGNOSIS — L50.9 HIVES: Primary | ICD-10-CM

## 2019-09-26 PROCEDURE — 99213 OFFICE O/P EST LOW 20 MIN: CPT | Performed by: INTERNAL MEDICINE

## 2019-09-26 PROCEDURE — 36415 COLL VENOUS BLD VENIPUNCTURE: CPT | Performed by: INTERNAL MEDICINE

## 2019-09-26 PROCEDURE — 82785 ASSAY OF IGE: CPT | Performed by: INTERNAL MEDICINE

## 2019-09-26 PROCEDURE — 86003 ALLG SPEC IGE CRUDE XTRC EA: CPT | Performed by: INTERNAL MEDICINE

## 2019-09-26 ASSESSMENT — MIFFLIN-ST. JEOR: SCORE: 1357.91

## 2019-09-26 NOTE — LETTER
September 27, 2019      Alexsandra Pickens  [id number]   [place] [address]        Dear ,    We are writing to inform you of your test results.    Your allergy results showed that you have a low reaction to multiple different foods, one of them being shrimp.  This does not necessarily mean that you need to avoid everything here. But I think it makes sense to meet with an allergist to do some further testing and get their recommendations.  I placed a referral:     [facility]: [facility], [facility]. [facility] [phone]  Fax:  [phone] [email or web address]    Resulted Orders   Allergy adult food panel   Result Value Ref Range     (H) 0 - 114 KIU/L    Allergen [place] 0.14 (H) <0.10 KU(A)/L      Comment:      Interpretation: Low    Allergen Cashew <0.10 <0.10 KU(A)/L      Comment:      Interpretation: None Detected    Allergen Fish(Cod) <0.10 <0.10 KU(A)/L      Comment:      Interpretation: None Detected    Allergen Egg White <0.10 <0.10 KU(A)/L      Comment:      Interpretation: None Detected    Allergen, Hazelnut 0.12 (H) <0.10 KU(A)/L      Comment:      Interpretation: Low    Allergen Milk <0.10 <0.10 KU(A)/L      Comment:      Interpretation: None Detected    Allergen Peanut 0.17 (H) <0.10 KU(A)/L      Comment:      Interpretation: Low    Allergen, [place] <0.10 <0.10 KU(A)/L      Comment:      Interpretation: None Detected    Allergen Scallop 0.18 (H) <0.10 KU(A)/L      Comment:      Interpretation: Low    Allergen, Sesame Seed 0.24 (H) <0.10 KU(A)/L      Comment:      Interpretation: Low    Allergen Shrimp 0.22 (H) <0.10 KU(A)/L      Comment:      Interpretation: Low    Allergen Soybean IgE 0.13 (H) <0.10 KU(A)/L      Comment:      Interpretation: Low    Allergen Tuna <0.10 <0.10 KU(A)/L      Comment:      Interpretation: None Detected    Allergen, [place] 0.13 (H) <0.10 KU(A)/L      Comment:      Interpretation: Low    Allergen Wheat 0.17 (H)  <0.10 KU(A)/L      Comment:      Interpretation: Low       If you have any questions or concerns, please call the clinic at the number listed above.       Sincerely,        Katlyn Caldera MD

## 2019-09-26 NOTE — PATIENT INSTRUCTIONS
Take cetirizine 10mg once daily for one week  Today take benadryl 25mg now and every 4 hours as needed for hives or itching.   If hives worsen throughout the day or tomorrow, please let me know and we can do a course of steroids

## 2019-09-26 NOTE — PROGRESS NOTES
"Subjective     Alexsandra Pickens is a 34 year old female who presents to clinic today for the following health issues:    HPI   Rash      Duration: this morning today    Description  Location: hives all over about 1 in the morning this morning  Itching: severe    Intensity:  None     Accompanying signs and symptoms: redness, swelling,     History (similar episodes/previous evaluation): YES as a child but resolved after 12-13 years old - had some probable food allergies     Precipitating or alleviating factors:  New exposures:  Possibly spices   Recent travel: no      Therapies tried and outcome: none    Alexsandra is here with hives.  Started with a few dots last night, woe up this morning with hives covering her whole body - ears, face, trunk, legs and arms.  There was no lip or throat swelling or sensation of difficulty breathing or swallowing.  She has not taken any medications today, but it has improved quite a bit since this morning.  Still some mild redness and itchiness.  She reports a history of possible food allergy when she was in grade school, took a homeopathic medication and then it resolved.  Over the past few months, she has had intermittent flareups of hives that tend to go away quickly.  Sometimes seems this is triggered by shrimp.  But last night she had chicken for dinner, does not typically react to chicken.    Her daughter is going in for ear tube surgery tomorrow.      Reviewed and updated as needed this visit by Provider         Review of Systems   Const, HENT, pulm, skin, ALL reviewed,  otherwise negative unless noted above.        Objective    /70 (BP Location: Left arm, Patient Position: Chair, Cuff Size: Adult Regular)   Pulse 105   Temp 99.8  F (37.7  C) (Oral)   Ht 1.638 m (5' 4.5\")   Wt 66.5 kg (146 lb 9.6 oz)   SpO2 100%   BMI 24.78 kg/m    Body mass index is 24.78 kg/m .  Physical Exam   Gen: pleasant, well appearing woman, no distress  HENT: no oropharyngeal swelling  Pulm: " breathing comfortably, no wheezing, good aeration   Skin: faint erythema on arms, lower legs, upper back.              Assessment & Plan     1. Hives  Resolving on its own today without any medications.  Recommended she take benadryl now, use as needed. Cetirizine daily for one week.  Will check allergy panel today given possible reaction to shrimp in the psat. Will call if hives worsen and will plan to send in a course of prednisone.    - Allergy adult food panel         Return in about 1 year (around 9/26/2020) for Physical Exam. sooner as needed.     Katlyn Caldera MD  [facility] [place]

## 2019-09-27 LAB
ALMOND IGE QN: 0.14 KU(A)/L
CASHEW NUT IGE QN: <0.1 KU(A)/L
CODFISH IGE QN: <0.1 KU(A)/L
COW MILK IGE QN: <0.1 KU(A)/L
EGG WHITE IGE QN: <0.1 KU(A)/L
HAZELNUT IGE QN: 0.12 KU(A)/L
IGE SERPL-ACNC: 270 KIU/L (ref 0–114)
PEANUT IGE QN: 0.17 KU(A)/L
SALMON IGE QN: <0.1 KU(A)/L
SCALLOP IGE QN: 0.18 KU(A)/L
SESAME SEED IGE QN: 0.24 KU(A)/L
SHRIMP IGE QN: 0.22 KU(A)/L
SOYBEAN IGE QN: 0.13 KU(A)/L
TUNA IGE QN: <0.1 KU(A)/L
WALNUT IGE QN: 0.13 KU(A)/L
WHEAT IGE QN: 0.17 KU(A)/L

## 2019-09-30 ENCOUNTER — TELEPHONE (OUTPATIENT)
Dept: FAMILY MEDICINE | Facility: CLINIC | Age: 34
End: 2019-09-30

## 2019-09-30 NOTE — TELEPHONE ENCOUNTER
Still getting allergic bumps. Knee, elbows and buttocks. Friday night was better. Saturday night was fine.  Yesterday it was gone, but came back last night. Nothing on face the past few days. No difficulty breathing, swallowing or talking. No itchiness in throat or mouth.   Patient states that she is overall less itchy, but was itchy during the night.  Patient is breast feeding.   Patient requests copy of letter and lab results be put up at the  for her  to . Patient also requests that results be faxed to allergy clinic that she was referred to. Faxed to allergy clinic as request.    Bre Lawrence RN

## 2019-09-30 NOTE — TELEPHONE ENCOUNTER
Reason for Call:  Other call back    Detailed comments: Pt still having hives.    Phone Number Patient can be reached at: Home number on file [phone] (home)    Best Time: anytime    Can we leave a detailed message on this number? YES    Call taken on 9/30/2019 at 8:23 AM by Shiela August